# Patient Record
Sex: FEMALE | NOT HISPANIC OR LATINO | URBAN - METROPOLITAN AREA
[De-identification: names, ages, dates, MRNs, and addresses within clinical notes are randomized per-mention and may not be internally consistent; named-entity substitution may affect disease eponyms.]

---

## 2018-08-20 ENCOUNTER — EMERGENCY (EMERGENCY)
Facility: HOSPITAL | Age: 53
LOS: 0 days | Discharge: HOME | End: 2018-08-20
Attending: EMERGENCY MEDICINE | Admitting: EMERGENCY MEDICINE
Payer: SELF-PAY

## 2018-08-20 VITALS
DIASTOLIC BLOOD PRESSURE: 73 MMHG | RESPIRATION RATE: 18 BRPM | HEART RATE: 62 BPM | OXYGEN SATURATION: 100 % | SYSTOLIC BLOOD PRESSURE: 125 MMHG

## 2018-08-20 VITALS
SYSTOLIC BLOOD PRESSURE: 121 MMHG | RESPIRATION RATE: 18 BRPM | HEIGHT: 62 IN | DIASTOLIC BLOOD PRESSURE: 70 MMHG | TEMPERATURE: 98 F | HEART RATE: 984 BPM | OXYGEN SATURATION: 100 % | WEIGHT: 149.91 LBS

## 2018-08-20 DIAGNOSIS — F17.210 NICOTINE DEPENDENCE, CIGARETTES, UNCOMPLICATED: ICD-10-CM

## 2018-08-20 DIAGNOSIS — Z91.81 HISTORY OF FALLING: ICD-10-CM

## 2018-08-20 DIAGNOSIS — G44.011 EPISODIC CLUSTER HEADACHE, INTRACTABLE: ICD-10-CM

## 2018-08-20 DIAGNOSIS — R51 HEADACHE: ICD-10-CM

## 2018-08-20 LAB
ALBUMIN SERPL ELPH-MCNC: 3.5 G/DL — SIGNIFICANT CHANGE UP (ref 3.3–5)
ALP SERPL-CCNC: 115 U/L — SIGNIFICANT CHANGE UP (ref 40–120)
ALT FLD-CCNC: 37 U/L — SIGNIFICANT CHANGE UP (ref 12–78)
ANION GAP SERPL CALC-SCNC: 6 MMOL/L — SIGNIFICANT CHANGE UP (ref 5–17)
AST SERPL-CCNC: 37 U/L — SIGNIFICANT CHANGE UP (ref 15–37)
BASOPHILS # BLD AUTO: 0.04 K/UL — SIGNIFICANT CHANGE UP (ref 0–0.2)
BASOPHILS NFR BLD AUTO: 0.5 % — SIGNIFICANT CHANGE UP (ref 0–2)
BILIRUB SERPL-MCNC: 0.2 MG/DL — SIGNIFICANT CHANGE UP (ref 0.2–1.2)
BUN SERPL-MCNC: 19 MG/DL — SIGNIFICANT CHANGE UP (ref 7–23)
CALCIUM SERPL-MCNC: 8.7 MG/DL — SIGNIFICANT CHANGE UP (ref 8.5–10.1)
CHLORIDE SERPL-SCNC: 110 MMOL/L — HIGH (ref 96–108)
CO2 SERPL-SCNC: 26 MMOL/L — SIGNIFICANT CHANGE UP (ref 22–31)
CREAT SERPL-MCNC: 0.88 MG/DL — SIGNIFICANT CHANGE UP (ref 0.5–1.3)
EOSINOPHIL # BLD AUTO: 0.21 K/UL — SIGNIFICANT CHANGE UP (ref 0–0.5)
EOSINOPHIL NFR BLD AUTO: 2.8 % — SIGNIFICANT CHANGE UP (ref 0–6)
ERYTHROCYTE [SEDIMENTATION RATE] IN BLOOD: 20 MM/HR — SIGNIFICANT CHANGE UP (ref 0–20)
GLUCOSE SERPL-MCNC: 121 MG/DL — HIGH (ref 70–99)
HCT VFR BLD CALC: 36.1 % — SIGNIFICANT CHANGE UP (ref 34.5–45)
HGB BLD-MCNC: 11.8 G/DL — SIGNIFICANT CHANGE UP (ref 11.5–15.5)
IMM GRANULOCYTES NFR BLD AUTO: 0.3 % — SIGNIFICANT CHANGE UP (ref 0–1.5)
LYMPHOCYTES # BLD AUTO: 2.57 K/UL — SIGNIFICANT CHANGE UP (ref 1–3.3)
LYMPHOCYTES # BLD AUTO: 34.2 % — SIGNIFICANT CHANGE UP (ref 13–44)
MCHC RBC-ENTMCNC: 28.4 PG — SIGNIFICANT CHANGE UP (ref 27–34)
MCHC RBC-ENTMCNC: 32.7 GM/DL — SIGNIFICANT CHANGE UP (ref 32–36)
MCV RBC AUTO: 87 FL — SIGNIFICANT CHANGE UP (ref 80–100)
MONOCYTES # BLD AUTO: 0.69 K/UL — SIGNIFICANT CHANGE UP (ref 0–0.9)
MONOCYTES NFR BLD AUTO: 9.2 % — SIGNIFICANT CHANGE UP (ref 2–14)
NEUTROPHILS # BLD AUTO: 3.98 K/UL — SIGNIFICANT CHANGE UP (ref 1.8–7.4)
NEUTROPHILS NFR BLD AUTO: 53 % — SIGNIFICANT CHANGE UP (ref 43–77)
NRBC # BLD: 0 /100 WBCS — SIGNIFICANT CHANGE UP (ref 0–0)
PLATELET # BLD AUTO: 262 K/UL — SIGNIFICANT CHANGE UP (ref 150–400)
POTASSIUM SERPL-MCNC: 3.8 MMOL/L — SIGNIFICANT CHANGE UP (ref 3.5–5.3)
POTASSIUM SERPL-SCNC: 3.8 MMOL/L — SIGNIFICANT CHANGE UP (ref 3.5–5.3)
PROT SERPL-MCNC: 6.9 GM/DL — SIGNIFICANT CHANGE UP (ref 6–8.3)
RBC # BLD: 4.15 M/UL — SIGNIFICANT CHANGE UP (ref 3.8–5.2)
RBC # FLD: 12.3 % — SIGNIFICANT CHANGE UP (ref 10.3–14.5)
SODIUM SERPL-SCNC: 142 MMOL/L — SIGNIFICANT CHANGE UP (ref 135–145)
WBC # BLD: 7.51 K/UL — SIGNIFICANT CHANGE UP (ref 3.8–10.5)
WBC # FLD AUTO: 7.51 K/UL — SIGNIFICANT CHANGE UP (ref 3.8–10.5)

## 2018-08-20 PROCEDURE — 73630 X-RAY EXAM OF FOOT: CPT | Mod: 26,RT

## 2018-08-20 PROCEDURE — 70450 CT HEAD/BRAIN W/O DYE: CPT | Mod: 26

## 2018-08-20 PROCEDURE — 99053 MED SERV 10PM-8AM 24 HR FAC: CPT

## 2018-08-20 PROCEDURE — 99285 EMERGENCY DEPT VISIT HI MDM: CPT | Mod: 25

## 2018-08-20 RX ORDER — CIPROFLOXACIN HCL 0.3 %
2 DROPS OPHTHALMIC (EYE) ONCE
Qty: 0 | Refills: 0 | Status: COMPLETED | OUTPATIENT
Start: 2018-08-20 | End: 2018-08-20

## 2018-08-20 RX ORDER — PROCHLORPERAZINE MALEATE 5 MG
10 TABLET ORAL ONCE
Qty: 0 | Refills: 0 | Status: COMPLETED | OUTPATIENT
Start: 2018-08-20 | End: 2018-08-20

## 2018-08-20 RX ORDER — KETOROLAC TROMETHAMINE 30 MG/ML
30 SYRINGE (ML) INJECTION ONCE
Qty: 0 | Refills: 0 | Status: DISCONTINUED | OUTPATIENT
Start: 2018-08-20 | End: 2018-08-20

## 2018-08-20 RX ORDER — SODIUM CHLORIDE 9 MG/ML
1000 INJECTION INTRAMUSCULAR; INTRAVENOUS; SUBCUTANEOUS ONCE
Qty: 0 | Refills: 0 | Status: COMPLETED | OUTPATIENT
Start: 2018-08-20 | End: 2018-08-20

## 2018-08-20 RX ORDER — CYCLOBENZAPRINE HYDROCHLORIDE 10 MG/1
10 TABLET, FILM COATED ORAL ONCE
Qty: 0 | Refills: 0 | Status: COMPLETED | OUTPATIENT
Start: 2018-08-20 | End: 2018-08-20

## 2018-08-20 RX ORDER — ACETAMINOPHEN 500 MG
1000 TABLET ORAL ONCE
Qty: 0 | Refills: 0 | Status: COMPLETED | OUTPATIENT
Start: 2018-08-20 | End: 2018-08-20

## 2018-08-20 RX ORDER — PROCHLORPERAZINE MALEATE 5 MG
10 TABLET ORAL ONCE
Qty: 0 | Refills: 0 | Status: DISCONTINUED | OUTPATIENT
Start: 2018-08-20 | End: 2018-08-20

## 2018-08-20 RX ORDER — CYCLOBENZAPRINE HYDROCHLORIDE 10 MG/1
1 TABLET, FILM COATED ORAL
Qty: 5 | Refills: 0 | OUTPATIENT
Start: 2018-08-20

## 2018-08-20 RX ADMIN — Medication 2 DROP(S): at 03:07

## 2018-08-20 RX ADMIN — SODIUM CHLORIDE 1000 MILLILITER(S): 9 INJECTION INTRAMUSCULAR; INTRAVENOUS; SUBCUTANEOUS at 02:05

## 2018-08-20 RX ADMIN — CYCLOBENZAPRINE HYDROCHLORIDE 10 MILLIGRAM(S): 10 TABLET, FILM COATED ORAL at 03:31

## 2018-08-20 RX ADMIN — Medication 1000 MILLIGRAM(S): at 01:50

## 2018-08-20 RX ADMIN — SODIUM CHLORIDE 1000 MILLILITER(S): 9 INJECTION INTRAMUSCULAR; INTRAVENOUS; SUBCUTANEOUS at 03:05

## 2018-08-20 RX ADMIN — Medication 10 MILLIGRAM(S): at 02:39

## 2018-08-20 RX ADMIN — Medication 400 MILLIGRAM(S): at 01:34

## 2018-08-20 RX ADMIN — Medication 30 MILLIGRAM(S): at 02:39

## 2018-08-20 NOTE — ED PROVIDER NOTE - MEDICAL DECISION MAKING DETAILS
Cluster headache likely due to smoking and headache location; oxygen and IV meds helped improve headache.  Ace bandage to right foot with hospital shoe recommended as well as f/u with PMD.

## 2018-08-20 NOTE — ED PROVIDER NOTE - EYES, MLM
Clear bilaterally, pupils equal, round and reactive to light.  Slight erythema associated with left conjunctiva.

## 2018-08-20 NOTE — ED ADULT NURSE REASSESSMENT NOTE - NS ED NURSE REASSESS COMMENT FT1
updated with plan of care. vss. medicated with pain medication as per md order. Will cont to monitor for safety and comfort. updated with plan of care. vss. medicated with pain medication as per md order. verbalize pain improvement. Will cont to monitor for safety and comfort.

## 2018-08-20 NOTE — ED ADULT NURSE NOTE - OBJECTIVE STATEMENT
54 y/o female awake alert and oriented x4 presents to ED c/o headache x3 days. Pt states she had blurry vision and fell on the floor hitting right foot on a rock. (+) swelling noted on right ankle/foot. Denies head injury or loc. Denies blurry vision, double vision, n/v/fever/chills. 54 y/o female awake alert and oriented x4 presents to ED c/o headache x3 days. Pt states she had blurry vision and fell on the floor hitting right foot on a rock. (+) swelling noted on right ankle/foot. Denies head injury or loc. c/o blurry and double vision on left eye. Took Excedrin x 3 days with no relief. Denies chest pain,sob,ha,d,zn/v/d/fever/chills.

## 2018-08-20 NOTE — ED PROVIDER NOTE - OBJECTIVE STATEMENT
Pt. is a 52 yo F with a hx of smoking visiting her cousin Pt. is a 54 yo F with a hx of smoking visiting her cousin from [place] c/o left sided headache X 3 days with redness and pain behind left eye and intermittent blurry vision in left eye.  Pts cousin states pt. fell yesterday afternoon due to dizziness from the headache and hit her head into the wall and injured her right foot.  Pt. took excedrin migraine yesterday morning without any relief.

## 2018-08-20 NOTE — ED ADULT NURSE REASSESSMENT NOTE - NS ED NURSE REASSESS COMMENT FT1
pt's right foot wrapped with ACE band and hard shoe provided for comfort. pt ambulate with steady gait. pt d/cd in stable condition. Discharge instructions reviewed with pt and daughter at bedside. Pt and her daughter verbalize good understanding.

## 2018-08-20 NOTE — ED PROVIDER NOTE - MUSCULOSKELETAL, MLM
Spine appears normal, range of motion is not limited, +tenderness on dorsal right foot over distal 2nd metatarsal

## 2018-08-20 NOTE — ED ADULT NURSE NOTE - NSIMPLEMENTINTERV_GEN_ALL_ED
Implemented All Fall Risk Interventions:  [place] to call system. Call bell, personal items and telephone within reach. Instruct patient to call for assistance. Room bathroom lighting operational. Non-slip footwear when patient is off stretcher. Physically safe environment: no spills, clutter or unnecessary equipment. Stretcher in lowest position, wheels locked, appropriate side rails in place. Provide visual cue, wrist band, yellow gown, etc. Monitor gait and stability. Monitor for mental status changes and reorient to person, place, and time. Review medications for side effects contributing to fall risk. Reinforce activity limits and safety measures with patient and family.

## 2018-08-20 NOTE — ED PROVIDER NOTE - PROGRESS NOTE DETAILS
Headache improved.  Ace bandage applied to right foot but no fracture on XR.  Will send meds to pharmacy and pt. will be referred to neurology for outpatient management of headaches.

## 2018-09-05 PROBLEM — Z00.00 ENCOUNTER FOR PREVENTIVE HEALTH EXAMINATION: Status: ACTIVE | Noted: 2018-09-05

## 2018-09-11 ENCOUNTER — APPOINTMENT (OUTPATIENT)
Dept: DERMATOLOGY | Facility: CLINIC | Age: 53
End: 2018-09-11

## 2022-11-04 ENCOUNTER — EMERGENCY (EMERGENCY)
Facility: HOSPITAL | Age: 57
LOS: 1 days | Discharge: HOME | End: 2022-11-04
Attending: EMERGENCY MEDICINE
Payer: MEDICAID

## 2022-11-04 VITALS
DIASTOLIC BLOOD PRESSURE: 78 MMHG | TEMPERATURE: 98 F | SYSTOLIC BLOOD PRESSURE: 133 MMHG | RESPIRATION RATE: 16 BRPM | HEART RATE: 64 BPM | WEIGHT: 138.89 LBS | HEIGHT: 62 IN | OXYGEN SATURATION: 97 %

## 2022-11-04 VITALS
TEMPERATURE: 98 F | RESPIRATION RATE: 17 BRPM | OXYGEN SATURATION: 95 % | DIASTOLIC BLOOD PRESSURE: 64 MMHG | HEART RATE: 61 BPM | SYSTOLIC BLOOD PRESSURE: 103 MMHG

## 2022-11-04 LAB
ALBUMIN SERPL ELPH-MCNC: 4.6 G/DL — SIGNIFICANT CHANGE UP (ref 3.3–5.2)
ALP SERPL-CCNC: 116 U/L — SIGNIFICANT CHANGE UP (ref 40–120)
ALT FLD-CCNC: 19 U/L — SIGNIFICANT CHANGE UP
ANION GAP SERPL CALC-SCNC: 12 MMOL/L — SIGNIFICANT CHANGE UP (ref 5–17)
AST SERPL-CCNC: 21 U/L — SIGNIFICANT CHANGE UP
BASOPHILS # BLD AUTO: 0.04 K/UL — SIGNIFICANT CHANGE UP (ref 0–0.2)
BASOPHILS NFR BLD AUTO: 0.3 % — SIGNIFICANT CHANGE UP (ref 0–2)
BILIRUB SERPL-MCNC: 0.4 MG/DL — SIGNIFICANT CHANGE UP (ref 0.4–2)
BUN SERPL-MCNC: 14.8 MG/DL — SIGNIFICANT CHANGE UP (ref 8–20)
CALCIUM SERPL-MCNC: 9.3 MG/DL — SIGNIFICANT CHANGE UP (ref 8.4–10.5)
CHLORIDE SERPL-SCNC: 102 MMOL/L — SIGNIFICANT CHANGE UP (ref 96–108)
CK SERPL-CCNC: 147 U/L — SIGNIFICANT CHANGE UP (ref 25–170)
CO2 SERPL-SCNC: 25 MMOL/L — SIGNIFICANT CHANGE UP (ref 22–29)
CREAT SERPL-MCNC: 0.74 MG/DL — SIGNIFICANT CHANGE UP (ref 0.5–1.3)
D DIMER BLD IA.RAPID-MCNC: <150 NG/ML DDU — SIGNIFICANT CHANGE UP
EGFR: 93 ML/MIN/1.73M2 — SIGNIFICANT CHANGE UP
EOSINOPHIL # BLD AUTO: 0.12 K/UL — SIGNIFICANT CHANGE UP (ref 0–0.5)
EOSINOPHIL NFR BLD AUTO: 1 % — SIGNIFICANT CHANGE UP (ref 0–6)
GLUCOSE SERPL-MCNC: 122 MG/DL — HIGH (ref 70–99)
HCT VFR BLD CALC: 42 % — SIGNIFICANT CHANGE UP (ref 34.5–45)
HGB BLD-MCNC: 14.3 G/DL — SIGNIFICANT CHANGE UP (ref 11.5–15.5)
IMM GRANULOCYTES NFR BLD AUTO: 0.3 % — SIGNIFICANT CHANGE UP (ref 0–0.9)
LYMPHOCYTES # BLD AUTO: 19.7 % — SIGNIFICANT CHANGE UP (ref 13–44)
LYMPHOCYTES # BLD AUTO: 2.49 K/UL — SIGNIFICANT CHANGE UP (ref 1–3.3)
MCHC RBC-ENTMCNC: 29.3 PG — SIGNIFICANT CHANGE UP (ref 27–34)
MCHC RBC-ENTMCNC: 34 GM/DL — SIGNIFICANT CHANGE UP (ref 32–36)
MCV RBC AUTO: 86.1 FL — SIGNIFICANT CHANGE UP (ref 80–100)
MONOCYTES # BLD AUTO: 0.86 K/UL — SIGNIFICANT CHANGE UP (ref 0–0.9)
MONOCYTES NFR BLD AUTO: 6.8 % — SIGNIFICANT CHANGE UP (ref 2–14)
NEUTROPHILS # BLD AUTO: 9.08 K/UL — HIGH (ref 1.8–7.4)
NEUTROPHILS NFR BLD AUTO: 71.9 % — SIGNIFICANT CHANGE UP (ref 43–77)
PLATELET # BLD AUTO: 295 K/UL — SIGNIFICANT CHANGE UP (ref 150–400)
POTASSIUM SERPL-MCNC: 4.5 MMOL/L — SIGNIFICANT CHANGE UP (ref 3.5–5.3)
POTASSIUM SERPL-SCNC: 4.5 MMOL/L — SIGNIFICANT CHANGE UP (ref 3.5–5.3)
PROT SERPL-MCNC: 7.4 G/DL — SIGNIFICANT CHANGE UP (ref 6.6–8.7)
RBC # BLD: 4.88 M/UL — SIGNIFICANT CHANGE UP (ref 3.8–5.2)
RBC # FLD: 12.3 % — SIGNIFICANT CHANGE UP (ref 10.3–14.5)
SODIUM SERPL-SCNC: 139 MMOL/L — SIGNIFICANT CHANGE UP (ref 135–145)
WBC # BLD: 12.63 K/UL — HIGH (ref 3.8–10.5)
WBC # FLD AUTO: 12.63 K/UL — HIGH (ref 3.8–10.5)

## 2022-11-04 RX ORDER — LIDOCAINE 4 G/100G
1 CREAM TOPICAL
Qty: 10 | Refills: 0
Start: 2022-11-04 | End: 2022-11-13

## 2022-11-04 RX ORDER — LIDOCAINE 4 G/100G
1 CREAM TOPICAL ONCE
Refills: 0 | Status: COMPLETED | OUTPATIENT
Start: 2022-11-04 | End: 2022-11-04

## 2022-11-04 RX ORDER — METHOCARBAMOL 500 MG/1
750 TABLET, FILM COATED ORAL ONCE
Refills: 0 | Status: COMPLETED | OUTPATIENT
Start: 2022-11-04 | End: 2022-11-04

## 2022-11-04 RX ORDER — METHOCARBAMOL 500 MG/1
1 TABLET, FILM COATED ORAL
Qty: 10 | Refills: 0
Start: 2022-11-04 | End: 2022-11-08

## 2022-11-04 RX ORDER — KETOROLAC TROMETHAMINE 30 MG/ML
15 SYRINGE (ML) INJECTION ONCE
Refills: 0 | Status: DISCONTINUED | OUTPATIENT
Start: 2022-11-04 | End: 2022-11-04

## 2022-11-04 RX ORDER — FENTANYL CITRATE 50 UG/ML
50 INJECTION INTRAVENOUS ONCE
Refills: 0 | Status: DISCONTINUED | OUTPATIENT
Start: 2022-11-04 | End: 2022-11-04

## 2022-11-04 RX ADMIN — LIDOCAINE 1 PATCH: 4 CREAM TOPICAL at 15:09

## 2022-11-04 RX ADMIN — Medication 15 MILLIGRAM(S): at 15:07

## 2022-11-04 RX ADMIN — METHOCARBAMOL 750 MILLIGRAM(S): 500 TABLET, FILM COATED ORAL at 15:09

## 2022-11-04 RX ADMIN — FENTANYL CITRATE 50 MICROGRAM(S): 50 INJECTION INTRAVENOUS at 12:56

## 2022-11-04 NOTE — ED STATDOCS - MUSCULOSKELETAL, MLM
range of motion is not limited and there is no muscle tenderness. No gross deformities or swelling, non localized tenderness of right arm, shoulder, with decrease ROM No gross deformities or swelling, non localized muscle and joint tenderness of right arm, shoulder, with decrease ROM, compartments soft

## 2022-11-04 NOTE — ED STATDOCS - OBJECTIVE STATEMENT
58 y/o female with PMHx of DM on Metformin presents to ED c/o arm pain. As per patient's family member at bedside who translated as patient speaks [place] reports Patient reports she was wearing a nicotine patch on the right arm, smoked with it on, and then once she removed it yesterday and developed pain and swelling to the area at 11am. Took 600mg of Ibuprofen about midnight, and used ice packs with no relief.     Denies trauma, injury, chest pain, neck pain, allergies 58 y/o female with PMHx of DM on Metformin presents to ED c/o arm pain. As per patient's family member at bedside who translated as patient speaks [place] reports Patient reports she was wearing a nicotine patch on the right arm, smoked with it on, and then once she removed it yesterday and developed pain and swelling to the area at 11am. Took 600mg of Ibuprofen about midnight, and used ice packs with no relief.   pain exacerbated by any movement of arm.    Denies trauma, injury, chest pain, neck pain, allergies

## 2022-11-04 NOTE — ED STATDOCS - PATIENT PORTAL LINK FT
You can access the FollowMyHealth Patient Portal offered by [facility] by registering at the following website: [email or web address]. By joining [employer]’s FollowMyHealth portal, you will also be able to view your health information using other applications (apps) compatible with our system.

## 2022-11-04 NOTE — ED STATDOCS - NEUROLOGICAL, MLM
sensation is normal and strength is normal. (+) Right radial pulse 1+, left radial pulse 2+, cap refill intact bilaterally sensation is normal and strength is normal.

## 2022-11-04 NOTE — ED STATDOCS - NSFOLLOWUPINSTRUCTIONS_ED_ALL_ED_FT
please take medication as prescribed  ROBAXIN CAUSE sleepiness do not take while working   call and follow up with primary care in 2-3 days  and orthopedic as given     Calcific Tendinitis    Body outline with a close-up showing a shoulder joint with crystals of calcium in a tendon.   Calcific tendinitis occurs when crystals of calcium are deposited in a tendon. Tendons are tough, cord-like tissues that connect muscle to bone. Tendons are an important part of joints. They make joints move, and they absorb some of the stress that a joint receives during use.    When calcium is deposited in the tendon, the tendon becomes stiff and painful and may become swollen. Calcific tendinitis often occurs in a tendon in the rotator cuff. The rotator cuff is a group of muscles and tendons in the shoulder joint.      What are the causes?    The cause of calcific tendinitis is not known. It may be associated with:  •Overusing a tendon, such as from doing the same movements over and over again (repetitive motion).      •Too much stress on the tendon.      •Age-related wear and tear.      •Repetitive, mild injuries.        What increases the risk?    This condition is more likely to develop in:  •People who do activities that involve repetitive motions.      •Older people.      •Women.      •People who have diabetes or thyroid problems (hypothyroidism).        What are the signs or symptoms?    Symptoms of this condition include:  •Pain. This condition may or may not be painful. If there is pain, it may occur when moving the joint.      •Tenderness when pressure is applied to the tendon.      •A snapping or popping sound when the joint moves.      •Decreased motion of the joint.      •Difficulty sleeping due to pain in the joint.        How is this diagnosed?    This condition is diagnosed with a physical exam. You may also have tests, such as:  •X-rays.      •MRI.      •CT scan.        How is this treated?    This condition generally gets better on its own. Treatment may also include:  •Resting, icing, applying pressure (compression), and raising (elevating) the area above the level of your heart. This is known as RICE therapy.      •Applying heat to the affected area.      •Medicines to help reduce inflammation or pain.      •Physical therapy to improve movement and strength of the tendon.      •Following a specific exercise program to keep the joint working properly.      In severe or persistent cases, treatment may include:  •Injecting steroids or pain-relieving medicines into or around the joint.      •Manipulating the joint after you are given medicine to numb the area (local anesthetic).      •Inflating the joint with sterile fluid to increase the flexibility of the tendons.      •Having shock wave therapy, which involves focusing sound waves on the joint.      If other treatments do not work, surgery may be needed to clean out the calcium deposits and possibly repair the tendon. Most people do not need surgery.      Follow these instructions at home:      Managing pain, stiffness, and swelling       Bag of ice on a towel on the skin.       A heating pad being used on the affected area.   •If directed, put ice on the affected area. To do this:  •Put ice in a plastic bag.      •Place a towel between your skin and the bag.      •Leave the ice on for 20 minutes, 2–3 times a day.      •Remove the ice if your skin turns bright red. This is very important. If you cannot feel pain, heat, or cold, you have a greater risk of damage to the area.      •If directed, apply heat to the affected area before you exercise or as often as told by your health care provider. Use the heat source that your health care provider recommends, such as a moist heat pack or a heating pad.  •Place a towel between your skin and the heat source.      •Leave the heat on for 20–30 minutes.      •Remove the heat if your skin turns bright red. This is especially important if you are unable to feel pain, heat, or cold. You have a greater risk of getting burned.        •Move the fingers or toes of the affected limb often. This can help to reduce stiffness and swelling.      •Raise (elevate) the injured area above the level of your heart while you are sitting or lying down.      Medicines     •Take over-the-counter and prescription medicines only as told by your health care provider.      •Ask your health care provider if the medicine prescribed to you requires you to avoid driving or using machinery.      General instructions     • Do not use any products that contain nicotine or tobacco. These products include cigarettes, chewing tobacco, and vaping devices, such as e-cigarettes. These can delay healing. If you need help quitting, ask your health care provider.      •Follow recommendations from your health care provider about activity and exercise. Ask your health care provider what activities are safe for you.      •Avoid using the affected area while you are experiencing symptoms of tendinitis.      •Wear an elastic bandage or compression wrap as told by your health care provider.      •Keep all follow-up visits. This is important.        Contact a health care provider if:    •You have pain or numbness that gets worse.      •You develop new weakness.      •You have increased joint stiffness or a sensation of looseness in the joint.      •You notice increasing redness, swelling, or warmth around the joint area.      •You have a fever and your symptoms suddenly get worse.        Summary    •Calcific tendinitis occurs when crystals of calcium are deposited in a tendon.      •Calcific tendinitis often occurs in a tendon in the rotator cuff, which is a group of muscles and tendons in the shoulder joint.      •This condition may or may not be painful.      •This condition generally gets better on its own.      •Treatment may include rest, ice, medicines, and physical therapy. In rare cases, surgery may be needed.      This information is not intended to replace advice given to you by your health care provider. Make sure you discuss any questions you have with your health care provider.

## 2022-11-04 NOTE — ED STATDOCS - CARE PROVIDER_API CALL
Cristian Bojorquez (DO)  Orthopaedic Surgery  [address] [place] [address]  Phone: [phone]  Fax: [phone]  Follow Up Time:

## 2022-11-04 NOTE — ED ADULT TRIAGE NOTE - CHIEF COMPLAINT QUOTE
"nicotine patch on arm. took it off and overnight my arm became very painful. hurts to move or touch.

## 2022-11-04 NOTE — ED STATDOCS - PROGRESS NOTE DETAILS
pt I seen by Dr lai initially agreed with hx , PE and plan   seen the pat the bed side -family member translate explained finding   applied sling - will share the result f.u ortho

## 2022-11-04 NOTE — ED PROCEDURE NOTE - NS ED PERI NEURO POS
Pre-application: Motor, sensory, and vascular responses NOT intact in the injured extremity./Post-application: Motor, sensory, and vascular responses NOT intact in the injured extremity.

## 2022-11-04 NOTE — ED STATDOCS - NS_ ATTENDINGSCRIBEDETAILS _ED_A_ED_FT
I, Cristian Terrell, performed the initial face to face bedside interview with this patient regarding history of present illness, review of symptoms and relevant past medical, social and family history.  I completed an independent physical examination.  I was the provider who initially evaluated this patient.  The history, relevant review of systems, past medical and surgical history, medical decision making, and physical examination was documented by the scribe in my presence and I attest to the accuracy of the documentation. Follow-up on ordered tests (ie labs, radiologic studies) and re-evaluation of the patient's status has been communicated to the ACP.  Disposition of the patient will be based on test outcome and response to ED interventions.

## 2025-01-30 ENCOUNTER — OFFICE (OUTPATIENT)
Dept: URBAN - METROPOLITAN AREA CLINIC 100 | Facility: CLINIC | Age: 60
Setting detail: OPHTHALMOLOGY
End: 2025-01-30
Payer: COMMERCIAL

## 2025-01-30 DIAGNOSIS — H02.403: ICD-10-CM

## 2025-01-30 DIAGNOSIS — H53.40: ICD-10-CM

## 2025-01-30 PROBLEM — H52.7 REFRACTIVE ERROR: Status: ACTIVE | Noted: 2025-01-30

## 2025-01-30 PROBLEM — Z41.1 ENCOUNTER FOR COSMETIC SURGERY: Status: ACTIVE | Noted: 2025-01-30

## 2025-01-30 PROCEDURE — 92002 INTRM OPH EXAM NEW PATIENT: CPT | Performed by: OPHTHALMOLOGY

## 2025-01-30 PROCEDURE — 92285 EXTERNAL OCULAR PHOTOGRAPHY: CPT | Performed by: OPHTHALMOLOGY

## 2025-01-30 ASSESSMENT — CONFRONTATIONAL VISUAL FIELD TEST (CVF)
OS_FINDINGS: FULL
OD_FINDINGS: FULL

## 2025-01-30 ASSESSMENT — VISUAL ACUITY
OD_BCVA: 20/30-2
OS_BCVA: 20/20

## 2025-01-30 ASSESSMENT — LID POSITION - DERMATOCHALASIS
OS_DERMATOCHALASIS: T
OD_DERMATOCHALASIS: T

## 2025-01-30 ASSESSMENT — LID POSITION - PTOSIS
OD_PTOSIS: RUL
OS_PTOSIS: LUL